# Patient Record
Sex: FEMALE | Race: WHITE | HISPANIC OR LATINO | URBAN - METROPOLITAN AREA
[De-identification: names, ages, dates, MRNs, and addresses within clinical notes are randomized per-mention and may not be internally consistent; named-entity substitution may affect disease eponyms.]

---

## 2020-09-07 ENCOUNTER — EMERGENCY (EMERGENCY)
Facility: HOSPITAL | Age: 35
LOS: 1 days | Discharge: HOME | End: 2020-09-07
Attending: EMERGENCY MEDICINE | Admitting: EMERGENCY MEDICINE
Payer: COMMERCIAL

## 2020-09-07 VITALS
DIASTOLIC BLOOD PRESSURE: 79 MMHG | HEART RATE: 58 BPM | OXYGEN SATURATION: 98 % | SYSTOLIC BLOOD PRESSURE: 116 MMHG | RESPIRATION RATE: 18 BRPM | TEMPERATURE: 99 F

## 2020-09-07 VITALS
HEIGHT: 63 IN | DIASTOLIC BLOOD PRESSURE: 79 MMHG | RESPIRATION RATE: 16 BRPM | TEMPERATURE: 98 F | HEART RATE: 73 BPM | OXYGEN SATURATION: 99 % | WEIGHT: 149.91 LBS | SYSTOLIC BLOOD PRESSURE: 128 MMHG

## 2020-09-07 DIAGNOSIS — R51 HEADACHE: ICD-10-CM

## 2020-09-07 DIAGNOSIS — G43.909 MIGRAINE, UNSPECIFIED, NOT INTRACTABLE, WITHOUT STATUS MIGRAINOSUS: ICD-10-CM

## 2020-09-07 PROCEDURE — 99284 EMERGENCY DEPT VISIT MOD MDM: CPT | Mod: 25

## 2020-09-07 PROCEDURE — 99284 EMERGENCY DEPT VISIT MOD MDM: CPT

## 2020-09-07 PROCEDURE — 96374 THER/PROPH/DIAG INJ IV PUSH: CPT

## 2020-09-07 PROCEDURE — 96375 TX/PRO/DX INJ NEW DRUG ADDON: CPT

## 2020-09-07 RX ORDER — DIPHENHYDRAMINE HCL 50 MG
25 CAPSULE ORAL ONCE
Refills: 0 | Status: COMPLETED | OUTPATIENT
Start: 2020-09-07 | End: 2020-09-07

## 2020-09-07 RX ORDER — SODIUM CHLORIDE 9 MG/ML
1000 INJECTION INTRAMUSCULAR; INTRAVENOUS; SUBCUTANEOUS ONCE
Refills: 0 | Status: COMPLETED | OUTPATIENT
Start: 2020-09-07 | End: 2020-09-07

## 2020-09-07 RX ORDER — METOCLOPRAMIDE HCL 10 MG
10 TABLET ORAL ONCE
Refills: 0 | Status: COMPLETED | OUTPATIENT
Start: 2020-09-07 | End: 2020-09-07

## 2020-09-07 RX ORDER — KETOROLAC TROMETHAMINE 30 MG/ML
30 SYRINGE (ML) INJECTION ONCE
Refills: 0 | Status: DISCONTINUED | OUTPATIENT
Start: 2020-09-07 | End: 2020-09-07

## 2020-09-07 RX ADMIN — Medication 25 MILLIGRAM(S): at 11:45

## 2020-09-07 RX ADMIN — Medication 104 MILLIGRAM(S): at 11:44

## 2020-09-07 RX ADMIN — SODIUM CHLORIDE 1000 MILLILITER(S): 9 INJECTION INTRAMUSCULAR; INTRAVENOUS; SUBCUTANEOUS at 11:43

## 2020-09-07 RX ADMIN — Medication 30 MILLIGRAM(S): at 11:45

## 2020-09-07 NOTE — ED ADULT NURSE NOTE - OBJECTIVE STATEMENT
Patient presents to the ED complaining of a frontal headache for a few days with eye pressure. Denies any history of headaches. No fever or chills. No head injury. No vision changes.

## 2020-09-07 NOTE — ED PROVIDER NOTE - ENMT, MLM
Airway patent, Nasal mucosa clear. Mouth with normal mucosa. Throat has no vesicles, no oropharyngeal exudates and uvula is midline. Ears: TMs pearly gray b/l, no frontal or maxillary sinus tend b/l

## 2020-09-07 NOTE — ED PROVIDER NOTE - OBJECTIVE STATEMENT
The pt is a 35 y/o F, who presents to ED c/o h/a x 4 d. Pt states pain is 5/10, pain to front of head, dull, achy, "fuzzy" vision, + nausea, + light sensitivity, took tyl and advil yest w/some relief. Denies fevers, chills, worst h/a of life, sudden onset of h/a, emesis, neck pain or stiffness, gait disturbances, rash

## 2020-09-07 NOTE — ED ADULT NURSE NOTE - NSIMPLEMENTINTERV_GEN_ALL_ED
Implemented All Universal Safety Interventions:  [place] to call system. Call bell, personal items and telephone within reach. Instruct patient to call for assistance. Room bathroom lighting operational. Non-slip footwear when patient is off stretcher. Physically safe environment: no spills, clutter or unnecessary equipment. Stretcher in lowest position, wheels locked, appropriate side rails in place.

## 2020-09-07 NOTE — ED PROVIDER NOTE - NSFOLLOWUPINSTRUCTIONS_ED_ALL_ED_FT
Migraine Headache  A migraine headache is a very strong throbbing pain on one side or both sides of your head. This type of headache can also cause other symptoms. It can last from 4 hours to 3 days. Talk with your doctor about what things may bring on (trigger) this condition.  What are the causes?  The exact cause of this condition is not known. This condition may be triggered or caused by:  Drinking alcohol.Smoking.Taking medicines, such as:  Medicine used to treat chest pain (nitroglycerin).Birth control pills.Estrogen.Some blood pressure medicines.Eating or drinking certain products.Doing physical activity.Other things that may trigger a migraine headache include:  Having a menstrual period.Pregnancy.Hunger.Stress.Not getting enough sleep or getting too much sleep.Weather changes.Tiredness (fatigue).What increases the risk?  Being 25–55 years old.Being female.Having a family history of migraine headaches.Being .Having depression or anxiety.Being very overweight.What are the signs or symptoms?  A throbbing pain. This pain may:  Happen in any area of the head, such as on one side or both sides.Make it hard to do daily activities.Get worse with physical activity.Get worse around bright lights or loud noises.Other symptoms may include:  Feeling sick to your stomach (nauseous).Vomiting.Dizziness.Being sensitive to bright lights, loud noises, or smells.Before you get a migraine headache, you may get warning signs (an aura). An aura may include:  Seeing flashing lights or having blind spots.Seeing bright spots, halos, or zigzag lines.Having tunnel vision or blurred vision.Having numbness or a tingling feeling.Having trouble talking.Having weak muscles.Some people have symptoms after a migraine headache (postdromal phase), such as:  Tiredness.Trouble thinking (concentrating).How is this treated?  Taking medicines that:  Relieve pain.Relieve the feeling of being sick to your stomach.Prevent migraine headaches.Treatment may also include:  Having acupuncture.Avoiding foods that bring on migraine headaches.Learning ways to control your body functions (biofeedback).Therapy to help you know and deal with negative thoughts (cognitive behavioral therapy).Follow these instructions at home:  Medicines   Take over-the-counter and prescription medicines only as told by your doctor.Ask your doctor if the medicine prescribed to you:  Requires you to avoid driving or using heavy machinery.Can cause trouble pooping (constipation). You may need to take these steps to prevent or treat trouble pooping:  Drink enough fluid to keep your pee (urine) pale yellow.Take over-the-counter or prescription medicines.Eat foods that are high in fiber. These include beans, whole grains, and fresh fruits and vegetables.Limit foods that are high in fat and sugar. These include fried or sweet foods.Lifestyle   Do not drink alcohol.Do not use any products that contain nicotine or tobacco, such as cigarettes, e-cigarettes, and chewing tobacco. If you need help quitting, ask your doctor.Get at least 8 hours of sleep every night.Limit and deal with stress.General instructions   Keep a journal to find out what may bring on your migraine headaches. For example, write down:  What you eat and drink.How much sleep you get.Any change in what you eat or drink.Any change in your medicines.If you have a migraine headache:  Avoid things that make your symptoms worse, such as bright lights.It may help to lie down in a dark, quiet room.Do not drive or use heavy machinery.Ask your doctor what activities are safe for you.Keep all follow-up visits as told by your doctor. This is important.Contact a doctor if:  You get a migraine headache that is different or worse than others you have had.You have more than 15 headache days in one month.Get help right away if:  Your migraine headache gets very bad.Your migraine headache lasts longer than 72 hours.You have a fever.You have a stiff neck.You have trouble seeing.Your muscles feel weak or like you cannot control them.You start to lose your balance a lot.You start to have trouble walking.You pass out (faint).You have a seizure.Summary  A migraine headache is a very strong throbbing pain on one side or both sides of your head. These headaches can also cause other symptoms.This condition may be treated with medicines and changes to your lifestyle.Keep a journal to find out what may bring on your migraine headaches.Contact a doctor if you get a migraine headache that is different or worse than others you have had.Contact your doctor if you have more than 15 headache days in a month.

## 2020-09-07 NOTE — ED ADULT TRIAGE NOTE - CHIEF COMPLAINT QUOTE
Pt directed to ED from  [facility] x4 days with associated photosensitivity and Nausea.  Pt denies Hx of Migraines.  Masked PTA.

## 2020-09-07 NOTE — ED PROVIDER NOTE - CLINICAL SUMMARY MEDICAL DECISION MAKING FREE TEXT BOX
pt c/o h/a x 4 d, gradual onset, not worst h/a of life, not thunder clap, no nuchal rigidity, no fevers, non focal neuro exam, suspect migraine variant, given migraine cocktail w/good relief, no indication for any emergent imaging at this time, pt understands importance of neuro f/u and may need mri as outpatient for diagnostic purposes, no benefit in ct head - radiation exposure discussed, strict return precautions given, pt happy w/plan

## 2020-09-07 NOTE — ED PROVIDER NOTE - CARE PROVIDER_API CALL
Shruthi Boateng)  Neurology; Vascular Neurology  [address], [address] [place] [address]  Phone: [phone]  Fax: [phone]  Follow Up Time:     Saqib Calvin  NEUROLOGY  [address], [address] [place] [id number]  Phone: [phone]  Fax: [phone]  Follow Up Time:

## 2020-09-07 NOTE — ED PROVIDER NOTE - CARE PROVIDERS DIRECT ADDRESSES
[email or web address]

## 2020-09-07 NOTE — ED PROVIDER NOTE - PATIENT PORTAL LINK FT
You can access the FollowMyHealth Patient Portal offered by [facility] by registering at the following website: [email or web address]. By joining [employer]’s FollowMyHealth portal, you will also be able to view your health information using other applications (apps) compatible with our system.

## 2020-09-08 PROBLEM — Z00.00 ENCOUNTER FOR PREVENTIVE HEALTH EXAMINATION: Status: ACTIVE | Noted: 2020-09-08

## 2023-01-26 NOTE — ED ADULT TRIAGE NOTE - BP NONINVASIVE DIASTOLIC (MM HG)
79 Glycopyrrolate Pregnancy And Lactation Text: This medication is Pregnancy Category B and is considered safe during pregnancy. It is unknown if it is excreted breast milk.

## 2024-11-14 NOTE — ED ADULT TRIAGE NOTE - BMI (KG/M2)
TRANSFER - IN REPORT:    Verbal report received from ROBYN RN on Jeronimo Moore Angel Sr.  being received from [facility] ED for routine progression of patient care      Report consisted of patient's Situation, Background, Assessment and   Recommendations(SBAR).     Information from the following report(s) Nurse Handoff Report was reviewed with the receiving nurse.    Opportunity for questions and clarification was provided.      Assessment completed upon patient's arrival to unit and care assumed.     26.6